# Patient Record
Sex: MALE | Race: BLACK OR AFRICAN AMERICAN | Employment: UNEMPLOYED | URBAN - METROPOLITAN AREA
[De-identification: names, ages, dates, MRNs, and addresses within clinical notes are randomized per-mention and may not be internally consistent; named-entity substitution may affect disease eponyms.]

---
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
## 2025-03-20 ENCOUNTER — HOSPITAL ENCOUNTER (OUTPATIENT)
Age: 20
Discharge: HOME | End: 2025-03-20
Payer: COMMERCIAL

## 2025-03-20 VITALS
HEART RATE: 81 BPM | OXYGEN SATURATION: 100 % | RESPIRATION RATE: 18 BRPM | SYSTOLIC BLOOD PRESSURE: 138 MMHG | TEMPERATURE: 97 F | DIASTOLIC BLOOD PRESSURE: 74 MMHG

## 2025-03-20 DIAGNOSIS — H00.015 HORDEOLUM EXTERNUM OF LEFT LOWER EYELID: Primary | ICD-10-CM

## 2025-03-20 PROCEDURE — 99203 OFFICE O/P NEW LOW 30 MIN: CPT

## 2025-03-20 RX ORDER — POLYMYXIN B SULFATE AND TRIMETHOPRIM 1; 10000 MG/ML; [USP'U]/ML
1 SOLUTION OPHTHALMIC
Qty: 10 ML | Refills: 0 | Status: SHIPPED | OUTPATIENT
Start: 2025-03-20 | End: 2025-03-25

## 2025-03-20 NOTE — DISCHARGE INSTRUCTIONS
Please return to the ER/clinic if symptoms worsen. Follow-up with your PCP in 24-48 hours as needed.      Avoid touching or rubbing the eyes.  Use a warm washcloth to clean out the follicular ducts.  You can use Jay & Jay baby shampoo.  Use the eyedrops as prescribed and get them into the space.  If symptoms persist or worsen recommend follow-up with ophthalmology for further evaluation and treatment

## 2025-03-20 NOTE — ED PROVIDER NOTES
Patient Seen in: [facility]      History     Chief Complaint   Patient presents with    Eye Pain     Stated Complaint: Eye issue    Subjective:   HPI          Objective:     History reviewed. No pertinent past medical history.           History reviewed. No pertinent surgical history.           The patient's medication list, past medical history and social history elements  as listed in today's nurse's notes are reviewed and agree.   The patient's family history is reviewed and is noncontributory to the presenting problem, except as indicated as above.     Social History     Socioeconomic History    Marital status: Single   Tobacco Use    Smoking status: Unknown              Review of Systems    Positive for stated complaint: Eye issue  Other systems are as noted in HPI.  Constitutional and vital signs reviewed.      All other systems reviewed and negative except as noted above.    Physical Exam     ED Triage Vitals [03/20/25 1537]   /74   Pulse 81   Resp 18   Temp 97.4 °F (36.3 °C)   Temp src Oral   SpO2 100 %   O2 Device None (Room air)       Current Vitals:   Vital Signs  BP: 138/74  Pulse: 81  Resp: 18  Temp: 97.4 °F (36.3 °C)  Temp src: Oral    Oxygen Therapy  SpO2: 100 %  O2 Device: None (Room air)        Physical Exam  Vitals and nursing note reviewed.   Constitutional:       Appearance: Normal appearance. He is well-developed.   HENT:      Head: Normocephalic.      Right Ear: External ear normal.      Left Ear: External ear normal.      Nose: Nose normal.      Mouth/Throat:      Mouth: Mucous membranes are moist.   Eyes:      General: Lids are normal.         Left eye: Hordeolum present.     Extraocular Movements: Extraocular movements intact.      Conjunctiva/sclera: Conjunctivae normal.      Pupils: Pupils are equal, round, and reactive to light.        Comments: LL lid: hordeolum noted   Cardiovascular:      Rate and Rhythm: Normal rate and regular rhythm.      Heart sounds: Normal  heart sounds.   Pulmonary:      Effort: Pulmonary effort is normal.      Breath sounds: Normal breath sounds.   Musculoskeletal:      Cervical back: Normal range of motion and neck supple.   Skin:     General: Skin is warm.      Capillary Refill: Capillary refill takes less than 2 seconds.   Neurological:      General: No focal deficit present.      Mental Status: He is alert and oriented to person, place, and time.   Psychiatric:         Mood and Affect: Mood normal.         Behavior: Behavior normal.         Thought Content: Thought content normal.         Judgment: Judgment normal.           ED Course                   MDM   Clinical Impression: L eye hordeolum  Course of Treatment:   Avoid touching or rubbing the eyes.  Use a warm washcloth to clean out the follicular ducts.  You can use Jay & Jay baby shampoo.  Use the eyedrops as prescribed and get them into the space.  If symptoms persist or worsen recommend follow-up with ophthalmology for further evaluation and treatment    The patient is encouraged to return if any concerning symptoms arise. Additional verbal discharge instructions are given and discussed. Discharge medications are discussed. The patient is in good condition throughout the visit today and remains so upon discharge. I discuss the plan of care with the patient, who expresses understanding. All questions and concerns are addressed to the patient's satisfaction prior to discharge today.  Previous conversations with PCP and charts were reviewed.                Disposition and Plan     Clinical Impression:  1. Hordeolum externum of left lower eyelid         Disposition:  Discharge  3/20/2025  4:08 pm    Follow-up:  [place] [address] [place] [address] [address]  [facility] [id number] [phone]        Reinaldo Bassett MD  [address] [facility] [address]  [facility] [address]  [phone]                Medications Prescribed:  Current Discharge Medication List         START taking these medications    Details   polymyxin B-trimethoprim 04128-1.1 UNIT/ML-% Ophthalmic Solution Apply 1 drop to eye Q3H While Awake for 5 days.  Qty: 10 mL, Refills: 0                 Supplementary Documentation:

## 2025-08-01 ENCOUNTER — HOSPITAL ENCOUNTER (OUTPATIENT)
Age: 20
Discharge: HOME | End: 2025-08-01

## 2025-08-01 VITALS
SYSTOLIC BLOOD PRESSURE: 153 MMHG | WEIGHT: 315 LBS | DIASTOLIC BLOOD PRESSURE: 87 MMHG | HEART RATE: 96 BPM | RESPIRATION RATE: 16 BRPM | OXYGEN SATURATION: 99 % | TEMPERATURE: 99 F

## 2025-08-01 DIAGNOSIS — H01.001 BLEPHARITIS OF RIGHT UPPER EYELID, UNSPECIFIED TYPE: Primary | ICD-10-CM

## 2025-08-01 PROCEDURE — 99214 OFFICE O/P EST MOD 30 MIN: CPT

## 2025-08-01 PROCEDURE — 99213 OFFICE O/P EST LOW 20 MIN: CPT

## 2025-08-01 RX ORDER — ERYTHROMYCIN 5 MG/G
1 OINTMENT OPHTHALMIC EVERY 6 HOURS
Qty: 1 G | Refills: 0 | Status: SHIPPED | OUTPATIENT
Start: 2025-08-01 | End: 2025-08-08